# Patient Record
Sex: MALE | URBAN - METROPOLITAN AREA
[De-identification: names, ages, dates, MRNs, and addresses within clinical notes are randomized per-mention and may not be internally consistent; named-entity substitution may affect disease eponyms.]

---

## 2021-08-20 ENCOUNTER — LAB REQUISITION (OUTPATIENT)
Dept: LAB | Facility: CLINIC | Age: 71
End: 2021-08-20
Payer: COMMERCIAL

## 2021-08-20 DIAGNOSIS — Z00.00 ENCOUNTER FOR GENERAL ADULT MEDICAL EXAMINATION WITHOUT ABNORMAL FINDINGS: ICD-10-CM

## 2021-08-20 LAB
ALBUMIN SERPL-MCNC: 3.6 G/DL (ref 3.4–5)
ALP SERPL-CCNC: 83 U/L (ref 40–150)
ALT SERPL W P-5'-P-CCNC: 33 U/L (ref 0–70)
ANION GAP SERPL CALCULATED.3IONS-SCNC: 8 MMOL/L (ref 3–14)
AST SERPL W P-5'-P-CCNC: 26 U/L (ref 0–45)
BILIRUB SERPL-MCNC: 0.3 MG/DL (ref 0.2–1.3)
BUN SERPL-MCNC: 15 MG/DL (ref 7–30)
CALCIUM SERPL-MCNC: 8.7 MG/DL (ref 8.5–10.1)
CHLORIDE BLD-SCNC: 111 MMOL/L (ref 94–109)
CHOLEST SERPL-MCNC: 193 MG/DL
CO2 SERPL-SCNC: 21 MMOL/L (ref 20–32)
CREAT SERPL-MCNC: 0.93 MG/DL (ref 0.66–1.25)
FASTING STATUS PATIENT QL REPORTED: NO
GFR SERPL CREATININE-BSD FRML MDRD: 82 ML/MIN/1.73M2
GLUCOSE BLD-MCNC: 115 MG/DL (ref 70–99)
HDLC SERPL-MCNC: 37 MG/DL
LDLC SERPL CALC-MCNC: 132 MG/DL
NONHDLC SERPL-MCNC: 156 MG/DL
POTASSIUM BLD-SCNC: 3.8 MMOL/L (ref 3.4–5.3)
PROT SERPL-MCNC: 8.2 G/DL (ref 6.8–8.8)
PSA SERPL-MCNC: 4.18 UG/L (ref 0–4)
SODIUM SERPL-SCNC: 140 MMOL/L (ref 133–144)
TRIGL SERPL-MCNC: 119 MG/DL
TSH SERPL DL<=0.005 MIU/L-ACNC: 1.88 MU/L (ref 0.4–4)

## 2021-08-20 PROCEDURE — 86803 HEPATITIS C AB TEST: CPT | Mod: ORL | Performed by: FAMILY MEDICINE

## 2021-08-20 PROCEDURE — 80053 COMPREHEN METABOLIC PANEL: CPT | Mod: ORL | Performed by: FAMILY MEDICINE

## 2021-08-20 PROCEDURE — G0103 PSA SCREENING: HCPCS | Mod: ORL | Performed by: FAMILY MEDICINE

## 2021-08-20 PROCEDURE — 87340 HEPATITIS B SURFACE AG IA: CPT | Mod: ORL | Performed by: FAMILY MEDICINE

## 2021-08-20 PROCEDURE — 86780 TREPONEMA PALLIDUM: CPT | Mod: ORL | Performed by: FAMILY MEDICINE

## 2021-08-20 PROCEDURE — 84443 ASSAY THYROID STIM HORMONE: CPT | Mod: ORL | Performed by: FAMILY MEDICINE

## 2021-08-20 PROCEDURE — 80061 LIPID PANEL: CPT | Mod: ORL | Performed by: FAMILY MEDICINE

## 2021-08-20 PROCEDURE — 86706 HEP B SURFACE ANTIBODY: CPT | Mod: ORL | Performed by: FAMILY MEDICINE

## 2021-08-21 LAB — T PALLIDUM AB SER QL: NONREACTIVE

## 2021-08-23 LAB
HBV SURFACE AB SERPL IA-ACNC: 117.62 M[IU]/ML
HBV SURFACE AG SERPL QL IA: NONREACTIVE
HCV AB SERPL QL IA: NONREACTIVE

## 2022-05-26 ENCOUNTER — LAB REQUISITION (OUTPATIENT)
Dept: LAB | Facility: CLINIC | Age: 72
End: 2022-05-26
Payer: COMMERCIAL

## 2022-05-26 DIAGNOSIS — Z00.00 ENCOUNTER FOR GENERAL ADULT MEDICAL EXAMINATION WITHOUT ABNORMAL FINDINGS: ICD-10-CM

## 2022-05-26 LAB
ALBUMIN SERPL-MCNC: 3.6 G/DL (ref 3.4–5)
ALP SERPL-CCNC: 82 U/L (ref 40–150)
ALT SERPL W P-5'-P-CCNC: 32 U/L (ref 0–70)
ANION GAP SERPL CALCULATED.3IONS-SCNC: 7 MMOL/L (ref 3–14)
AST SERPL W P-5'-P-CCNC: 23 U/L (ref 0–45)
BILIRUB SERPL-MCNC: 0.4 MG/DL (ref 0.2–1.3)
BUN SERPL-MCNC: 14 MG/DL (ref 7–30)
CALCIUM SERPL-MCNC: 9.3 MG/DL (ref 8.5–10.1)
CHLORIDE BLD-SCNC: 103 MMOL/L (ref 94–109)
CHOLEST SERPL-MCNC: 191 MG/DL
CO2 SERPL-SCNC: 28 MMOL/L (ref 20–32)
CREAT SERPL-MCNC: 0.88 MG/DL (ref 0.66–1.25)
FASTING STATUS PATIENT QL REPORTED: NO
GFR SERPL CREATININE-BSD FRML MDRD: >90 ML/MIN/1.73M2
GLUCOSE BLD-MCNC: 101 MG/DL (ref 70–99)
HDLC SERPL-MCNC: 36 MG/DL
LDLC SERPL CALC-MCNC: 131 MG/DL
NONHDLC SERPL-MCNC: 155 MG/DL
POTASSIUM BLD-SCNC: 4 MMOL/L (ref 3.4–5.3)
PROT SERPL-MCNC: 8.3 G/DL (ref 6.8–8.8)
PSA SERPL-MCNC: 2.88 UG/L (ref 0–4)
SODIUM SERPL-SCNC: 138 MMOL/L (ref 133–144)
TRIGL SERPL-MCNC: 121 MG/DL

## 2022-05-26 PROCEDURE — 80053 COMPREHEN METABOLIC PANEL: CPT | Mod: ORL | Performed by: FAMILY MEDICINE

## 2022-05-26 PROCEDURE — 80061 LIPID PANEL: CPT | Mod: ORL | Performed by: FAMILY MEDICINE

## 2022-05-26 PROCEDURE — G0103 PSA SCREENING: HCPCS | Performed by: FAMILY MEDICINE

## 2022-08-24 ENCOUNTER — LAB REQUISITION (OUTPATIENT)
Dept: LAB | Facility: CLINIC | Age: 72
End: 2022-08-24
Payer: COMMERCIAL

## 2022-08-24 DIAGNOSIS — R50.9 FEVER, UNSPECIFIED: ICD-10-CM

## 2022-08-24 LAB
ALBUMIN SERPL BCG-MCNC: 3.8 G/DL (ref 3.5–5.2)
ALP SERPL-CCNC: 73 U/L (ref 40–129)
ALT SERPL W P-5'-P-CCNC: 17 U/L (ref 10–50)
ANION GAP SERPL CALCULATED.3IONS-SCNC: 10 MMOL/L (ref 7–15)
AST SERPL W P-5'-P-CCNC: 21 U/L (ref 10–50)
BILIRUB SERPL-MCNC: 0.4 MG/DL
BUN SERPL-MCNC: 10.8 MG/DL (ref 8–23)
CALCIUM SERPL-MCNC: 9.7 MG/DL (ref 8.8–10.2)
CHLORIDE SERPL-SCNC: 103 MMOL/L (ref 98–107)
CREAT SERPL-MCNC: 0.81 MG/DL (ref 0.67–1.17)
DEPRECATED HCO3 PLAS-SCNC: 25 MMOL/L (ref 22–29)
GFR SERPL CREATININE-BSD FRML MDRD: >90 ML/MIN/1.73M2
GLUCOSE SERPL-MCNC: 82 MG/DL (ref 70–99)
POTASSIUM SERPL-SCNC: 4.2 MMOL/L (ref 3.4–5.3)
PROT SERPL-MCNC: 7.8 G/DL (ref 6.4–8.3)
SODIUM SERPL-SCNC: 138 MMOL/L (ref 136–145)

## 2022-08-24 PROCEDURE — 80053 COMPREHEN METABOLIC PANEL: CPT | Mod: ORL | Performed by: FAMILY MEDICINE

## 2022-08-24 PROCEDURE — 87015 SPECIMEN INFECT AGNT CONCNTJ: CPT | Mod: ORL | Performed by: FAMILY MEDICINE

## 2022-08-30 ENCOUNTER — LAB REQUISITION (OUTPATIENT)
Dept: LAB | Facility: CLINIC | Age: 72
End: 2022-08-30
Payer: COMMERCIAL

## 2022-08-30 DIAGNOSIS — R50.9 FEVER, UNSPECIFIED: ICD-10-CM

## 2022-08-31 LAB — MALARIA SMEAR BLD: NEGATIVE

## 2022-11-28 ENCOUNTER — LAB REQUISITION (OUTPATIENT)
Dept: LAB | Facility: CLINIC | Age: 72
End: 2022-11-28
Payer: COMMERCIAL

## 2022-11-28 DIAGNOSIS — R50.9 FEVER, UNSPECIFIED: ICD-10-CM

## 2022-12-02 LAB
SARS-COV-2 RNA RESP QL NAA+PROBE: NEGATIVE
SCANNED LAB RESULT: NORMAL

## 2023-07-14 ENCOUNTER — LAB REQUISITION (OUTPATIENT)
Dept: LAB | Facility: CLINIC | Age: 73
End: 2023-07-14
Payer: COMMERCIAL

## 2023-07-14 DIAGNOSIS — Z00.00 ENCOUNTER FOR GENERAL ADULT MEDICAL EXAMINATION WITHOUT ABNORMAL FINDINGS: ICD-10-CM

## 2023-07-14 LAB
ALBUMIN SERPL BCG-MCNC: 4.2 G/DL (ref 3.5–5.2)
ALP SERPL-CCNC: 70 U/L (ref 40–129)
ALT SERPL W P-5'-P-CCNC: 17 U/L (ref 0–70)
ANION GAP SERPL CALCULATED.3IONS-SCNC: 13 MMOL/L (ref 7–15)
AST SERPL W P-5'-P-CCNC: 23 U/L (ref 0–45)
BILIRUB SERPL-MCNC: 0.2 MG/DL
BUN SERPL-MCNC: 18 MG/DL (ref 8–23)
CALCIUM SERPL-MCNC: 9.2 MG/DL (ref 8.8–10.2)
CHLORIDE SERPL-SCNC: 105 MMOL/L (ref 98–107)
CREAT SERPL-MCNC: 1.05 MG/DL (ref 0.67–1.17)
DEPRECATED HCO3 PLAS-SCNC: 22 MMOL/L (ref 22–29)
GFR SERPL CREATININE-BSD FRML MDRD: 75 ML/MIN/1.73M2
GLUCOSE SERPL-MCNC: 100 MG/DL (ref 70–99)
POTASSIUM SERPL-SCNC: 4.2 MMOL/L (ref 3.4–5.3)
PROT SERPL-MCNC: 7.6 G/DL (ref 6.4–8.3)
SODIUM SERPL-SCNC: 140 MMOL/L (ref 136–145)
T4 FREE SERPL-MCNC: 0.93 NG/DL (ref 0.9–1.7)
TSH SERPL DL<=0.005 MIU/L-ACNC: 0.02 UIU/ML (ref 0.3–4.2)

## 2023-07-14 PROCEDURE — 84443 ASSAY THYROID STIM HORMONE: CPT | Mod: ORL | Performed by: FAMILY MEDICINE

## 2023-07-14 PROCEDURE — 84439 ASSAY OF FREE THYROXINE: CPT | Mod: ORL | Performed by: FAMILY MEDICINE

## 2023-07-14 PROCEDURE — 80053 COMPREHEN METABOLIC PANEL: CPT | Mod: ORL | Performed by: FAMILY MEDICINE

## 2023-12-19 ENCOUNTER — LAB REQUISITION (OUTPATIENT)
Dept: LAB | Facility: CLINIC | Age: 73
End: 2023-12-19
Payer: COMMERCIAL

## 2023-12-19 DIAGNOSIS — Z00.00 ENCOUNTER FOR GENERAL ADULT MEDICAL EXAMINATION WITHOUT ABNORMAL FINDINGS: ICD-10-CM

## 2023-12-19 LAB — HEMOCCULT STL QL IA: NEGATIVE

## 2023-12-19 PROCEDURE — 82274 ASSAY TEST FOR BLOOD FECAL: CPT | Mod: ORL | Performed by: FAMILY MEDICINE

## 2024-05-13 ENCOUNTER — LAB REQUISITION (OUTPATIENT)
Dept: LAB | Facility: CLINIC | Age: 74
End: 2024-05-13
Payer: COMMERCIAL

## 2024-05-13 DIAGNOSIS — R04.0 EPISTAXIS: ICD-10-CM

## 2024-05-13 PROCEDURE — 86481 TB AG RESPONSE T-CELL SUSP: CPT | Mod: ORL | Performed by: FAMILY MEDICINE

## 2024-05-15 LAB
GAMMA INTERFERON BACKGROUND BLD IA-ACNC: 0.11 IU/ML
M TB IFN-G BLD-IMP: NEGATIVE
M TB IFN-G CD4+ BCKGRND COR BLD-ACNC: 9.89 IU/ML
MITOGEN IGNF BCKGRD COR BLD-ACNC: -0.01 IU/ML
MITOGEN IGNF BCKGRD COR BLD-ACNC: 0.03 IU/ML
QUANTIFERON MITOGEN: 10 IU/ML
QUANTIFERON NIL TUBE: 0.11 IU/ML
QUANTIFERON TB1 TUBE: 0.14 IU/ML
QUANTIFERON TB2 TUBE: 0.1

## 2025-04-21 ENCOUNTER — MEDICAL CORRESPONDENCE (OUTPATIENT)
Dept: HEALTH INFORMATION MANAGEMENT | Facility: CLINIC | Age: 75
End: 2025-04-21
Payer: COMMERCIAL

## 2025-04-22 ENCOUNTER — TRANSCRIBE ORDERS (OUTPATIENT)
Dept: OTHER | Age: 75
End: 2025-04-22

## 2025-04-22 DIAGNOSIS — R22.31 NODULE OF FINGER OF RIGHT HAND: Primary | ICD-10-CM

## 2025-05-06 ENCOUNTER — APPOINTMENT (OUTPATIENT)
Dept: INTERPRETER SERVICES | Facility: CLINIC | Age: 75
End: 2025-05-06
Payer: COMMERCIAL

## 2025-05-06 NOTE — TELEPHONE ENCOUNTER
DIAGNOSIS:   Nodule of finger of right hand [R22.31]  - Primary   APPOINTMENT DATE: 05/20/2025   NOTES STATUS DETAILS   OFFICE NOTE from referring provider Internal 04/21/2025 - Henry Ferrer MD  - [facility]

## 2025-05-12 NOTE — H&P (VIEW-ONLY)
"Chief Complaint:   Chief Complaint   Patient presents with    RECHECK     Right hand nodule        Referring Physician: Henry Ferrer    Diagnosis: Right long finger mass   Treatment: None    History of Present Illness: Fish Dillard is a 75 year old RHD male presenting for evaluation of right long finger dorsal mass that has been slowly growing for the last year. Denies pain, deficits in range of motion, numbness or tingling. He would like to have it removed.     Clinical documentation by Dr. Ferrer on 4/21/2025 was reviewed.    Occupation: None    Past Medical History: Denies history of diabetes, heart disease.     Past Surgical History: No past surgical history on file.    Medications:  Tylenol, flonase, vit d    Allergy: None    Social History:   History   Smoking Status    Not on file   Smokeless Tobacco    Not on file          Family History: No family history on file.    Physical Examination:  Vitals:    05/20/25 1506   Weight: 78 kg (172 lb)   Height: 1.753 m (5' 9\")     Body mass index is 25.4 kg/m .    Well appearing, well nourished  Alert and oriented x 3, cooperative with exam     Right hand  There is a palpable raised mass along the dorsal proximal middle phalanx. It is nontender. Mobile underneath subcutanous tissue. Does not appear adherent to tendon or bone.   Motor Exam: Full extension and flexion of the long finger.   Sensory Exam: Sensation intact to light touch in FDWS (radial), volar IF (median), volar SF (ulnar)  Vascular Exam: fingers warm, well perfused    Imaging/Studies:  XR (3 views) of the right middle finger was obtained 5/20/2025.  I reviewed the images with the patient.  The imaging study shows no fracture/dislocation.  Well maintained joint space. No obvious masses or irregularities.     Assessment: Fish Dillard is a 75 year old male with likely cystic mass along the dorsum of right long finger. Discussed options including monitoring, aspiration, vs surgical resection. Risks " of surgical resection include bleeding, infection, wound issues, damage to surrounding structures, and recurrence of mass. The mass will be sent for pathology. Patient prefers surgical resection.     Plan:   I had a discussion with the patient regarding my clinical findings, diagnosis, and treatment plan.  All questions answered.     Surgery orders placed for right long finger mass excision to be performed in procedure room under local only     Next Visit:   Follow-up: 7-10 days after procedure    Imaging: None   Plan: Mass excision and then follow up in 7-10 days for wound check     Discussed and seen with Dr. Vaishali Bird MD  Hand Fellow     I have seen and evaluated this patient myself and agree with the documentation.  I had a detailed discussion with the patient regarding my clinical findings, diagnosis, and treatment plan. I reviewed the treatment options for his mass (observation, excision, etc.) as well as the risks and benefits associated with each.  Due to persistent pain/discomfort with ADLs, the patient would like the mass removed.  I had a thorough discussion today with the patient with regard to the intended surgical procedure. We reviewed the risks of surgery, which include but are not limited to infection, bleeding, nerve injury, permanent numbness, recurrence, post-operative stiffness, surgical scar, CRPS, anesthetic complications, etc.  We also discussed that there is a possibility that the surgery will not be successful and will require repeat surgery. We reviewed post-operative pain management and rehabilitation. The patient understands and agrees to the treatment plan.  All questions answered.      Plan for operative intervention in the form of right middle finger mass excision     Next Visit:   Follow-up: 10-14 days after surgery.   Imaging: None.   Plan: Wound check.    JES IRBY MD

## 2025-05-12 NOTE — PROGRESS NOTES
"Chief Complaint:   Chief Complaint   Patient presents with    RECHECK     Right hand nodule        Referring Physician: Henry Ferrer    Diagnosis: Right long finger mass   Treatment: None    History of Present Illness: Fish Dillard is a 75 year old RHD male presenting for evaluation of right long finger dorsal mass that has been slowly growing for the last year. Denies pain, deficits in range of motion, numbness or tingling. He would like to have it removed.     Clinical documentation by Dr. Ferrer on 4/21/2025 was reviewed.    Occupation: None    Past Medical History: Denies history of diabetes, heart disease.     Past Surgical History: No past surgical history on file.    Medications:  Tylenol, flonase, vit d    Allergy: None    Social History:   History   Smoking Status    Not on file   Smokeless Tobacco    Not on file          Family History: No family history on file.    Physical Examination:  Vitals:    05/20/25 1506   Weight: 78 kg (172 lb)   Height: 1.753 m (5' 9\")     Body mass index is 25.4 kg/m .    Well appearing, well nourished  Alert and oriented x 3, cooperative with exam     Right hand  There is a palpable raised mass along the dorsal proximal middle phalanx. It is nontender. Mobile underneath subcutanous tissue. Does not appear adherent to tendon or bone.   Motor Exam: Full extension and flexion of the long finger.   Sensory Exam: Sensation intact to light touch in FDWS (radial), volar IF (median), volar SF (ulnar)  Vascular Exam: fingers warm, well perfused    Imaging/Studies:  XR (3 views) of the right middle finger was obtained 5/20/2025.  I reviewed the images with the patient.  The imaging study shows no fracture/dislocation.  Well maintained joint space. No obvious masses or irregularities.     Assessment: Fish Dillard is a 75 year old male with likely cystic mass along the dorsum of right long finger. Discussed options including monitoring, aspiration, vs surgical resection. Risks " of surgical resection include bleeding, infection, wound issues, damage to surrounding structures, and recurrence of mass. The mass will be sent for pathology. Patient prefers surgical resection.     Plan:   I had a discussion with the patient regarding my clinical findings, diagnosis, and treatment plan.  All questions answered.     Surgery orders placed for right long finger mass excision to be performed in procedure room under local only     Next Visit:   Follow-up: 7-10 days after procedure    Imaging: None   Plan: Mass excision and then follow up in 7-10 days for wound check     Discussed and seen with Dr. Vaishali Bird MD  Hand Fellow     I have seen and evaluated this patient myself and agree with the documentation.  I had a detailed discussion with the patient regarding my clinical findings, diagnosis, and treatment plan. I reviewed the treatment options for his mass (observation, excision, etc.) as well as the risks and benefits associated with each.  Due to persistent pain/discomfort with ADLs, the patient would like the mass removed.  I had a thorough discussion today with the patient with regard to the intended surgical procedure. We reviewed the risks of surgery, which include but are not limited to infection, bleeding, nerve injury, permanent numbness, recurrence, post-operative stiffness, surgical scar, CRPS, anesthetic complications, etc.  We also discussed that there is a possibility that the surgery will not be successful and will require repeat surgery. We reviewed post-operative pain management and rehabilitation. The patient understands and agrees to the treatment plan.  All questions answered.      Plan for operative intervention in the form of right middle finger mass excision     Next Visit:   Follow-up: 10-14 days after surgery.   Imaging: None.   Plan: Wound check.    JES IRYB MD

## 2025-05-13 DIAGNOSIS — R22.30: Primary | ICD-10-CM

## 2025-05-20 ENCOUNTER — OFFICE VISIT (OUTPATIENT)
Dept: ORTHOPEDICS | Facility: CLINIC | Age: 75
End: 2025-05-20
Attending: FAMILY MEDICINE
Payer: COMMERCIAL

## 2025-05-20 ENCOUNTER — PRE VISIT (OUTPATIENT)
Dept: ORTHOPEDICS | Facility: CLINIC | Age: 75
End: 2025-05-20

## 2025-05-20 ENCOUNTER — ANCILLARY PROCEDURE (OUTPATIENT)
Dept: GENERAL RADIOLOGY | Facility: CLINIC | Age: 75
End: 2025-05-20
Attending: STUDENT IN AN ORGANIZED HEALTH CARE EDUCATION/TRAINING PROGRAM
Payer: COMMERCIAL

## 2025-05-20 VITALS — WEIGHT: 172 LBS | HEIGHT: 69 IN | BODY MASS INDEX: 25.48 KG/M2

## 2025-05-20 DIAGNOSIS — R22.31 NODULE OF FINGER OF RIGHT HAND: ICD-10-CM

## 2025-05-20 DIAGNOSIS — R22.30: ICD-10-CM

## 2025-05-20 PROCEDURE — 73130 X-RAY EXAM OF HAND: CPT | Mod: RT | Performed by: RADIOLOGY

## 2025-05-20 RX ORDER — CHOLECALCIFEROL (VITAMIN D3) 50 MCG
1 TABLET ORAL
COMMUNITY
Start: 2025-05-13

## 2025-05-20 RX ORDER — FLUTICASONE PROPIONATE 50 MCG
SPRAY, SUSPENSION (ML) NASAL
COMMUNITY
Start: 2025-05-16

## 2025-05-20 RX ORDER — PSEUDOEPHED/ACETAMINOPH/DIPHEN 30MG-500MG
TABLET ORAL
COMMUNITY
Start: 2025-05-05

## 2025-05-20 RX ORDER — MINERAL OIL/HYDROPHIL PETROLAT
OINTMENT (GRAM) TOPICAL
COMMUNITY
Start: 2025-05-16

## 2025-05-20 RX ORDER — POLYMYXIN B SULFATE AND TRIMETHOPRIM 1; 10000 MG/ML; [USP'U]/ML
SOLUTION OPHTHALMIC
COMMUNITY
Start: 2025-05-05 | End: 2025-05-20

## 2025-05-20 NOTE — NURSING NOTE
"Reason For Visit:   Chief Complaint   Patient presents with    RECHECK     Right hand nodule        Primary MD: Adelia Ref-Primary, Physician  Ref. MD: Nabil    Age: 75 year old    ?  No      Ht 1.753 m (5' 9\")   Wt 78 kg (172 lb)   BMI 25.40 kg/m        Pain Assessment  Patient Currently in Pain: No    Hand Dominance Evaluation  Hand Dominance: Right         Current Outpatient Medications   Medication Sig Dispense Refill    acetaminophen (TYLENOL) 500 MG tablet TAKE 2 TABLETS BY MOUTH EVERY SIX (6) HOURS AS NEEDED FOR PAIN OR FEVER      fluticasone (FLONASE) 50 MCG/ACT nasal spray SPRAY TWO (2) SPRAYS INTO BOTH NOSTRILS ONCE DAILY      mineral oil-hydrophilic petrolatum (AQUAPHOR) external ointment APPLY ONE (1) APPLICATION  TOPICALLY TWO (2) (TWO) TIMES DAILY AS NEEDED (FOR DRY SKIN)      VITAMIN D3 50 MCG (2000 UT) tablet Take 1 tablet by mouth daily at 2 pm.         No Known Allergies    Fidelia Naik"

## 2025-05-20 NOTE — LETTER
"5/20/2025      Fish Dillard  [address]  [place] [address]      Dear Colleague,    Thank you for referring your patient, Fish Dillard, to the [facility] [place]. Please see a copy of my visit note below.    Chief Complaint:   Chief Complaint   Patient presents with     RECHECK     Right hand nodule        Referring Physician: Henry Ferrer    Diagnosis: Right long finger mass   Treatment: None    History of Present Illness: Fish Dillard is a 75 year old RHD male presenting for evaluation of right long finger dorsal mass that has been slowly growing for the last year. Denies pain, deficits in range of motion, numbness or tingling. He would like to have it removed.     Clinical documentation by Dr. Ferrer on 4/21/2025 was reviewed.    Occupation: None    Past Medical History: Denies history of diabetes, heart disease.     Past Surgical History: No past surgical history on file.    Medications:  Tylenol, flonase, vit d    Allergy: None    Social History:   History   Smoking Status     Not on file   Smokeless Tobacco     Not on file          Family History: No family history on file.    Physical Examination:  Vitals:    05/20/25 1506   Weight: 78 kg (172 lb)   Height: 1.753 m (5' 9\")     Body mass index is 25.4 kg/m .    Well appearing, well nourished  Alert and oriented x 3, cooperative with exam     Right hand  There is a palpable raised mass along the dorsal proximal middle phalanx. It is nontender. Mobile underneath subcutanous tissue. Does not appear adherent to tendon or bone.   Motor Exam: Full extension and flexion of the long finger.   Sensory Exam: Sensation intact to light touch in FDWS (radial), volar IF (median), volar SF (ulnar)  Vascular Exam: fingers warm, well perfused    Imaging/Studies:  XR (3 views) of the right middle finger was obtained 5/20/2025.  I reviewed the images with the patient.  The imaging study shows no fracture/dislocation.  " Well maintained joint space. No obvious masses or irregularities.     Assessment: Fish Dillard is a 75 year old male with likely cystic mass along the dorsum of right long finger. Discussed options including monitoring, aspiration, vs surgical resection. Risks of surgical resection include bleeding, infection, wound issues, damage to surrounding structures, and recurrence of mass. The mass will be sent for pathology. Patient prefers surgical resection.     Plan:   I had a discussion with the patient regarding my clinical findings, diagnosis, and treatment plan.  All questions answered.      Surgery orders placed for right long finger mass excision to be performed in procedure room under local only     Next Visit:    Follow-up: 7-10 days after procedure     Imaging: None    Plan: Mass excision and then follow up in 7-10 days for wound check     Discussed and seen with Dr. Vaishali Bird MD  Hand Fellow     I have seen and evaluated this patient myself and agree with the documentation.  I had a detailed discussion with the patient regarding my clinical findings, diagnosis, and treatment plan. I reviewed the treatment options for his mass (observation, excision, etc.) as well as the risks and benefits associated with each.  Due to persistent pain/discomfort with ADLs, the patient would like the mass removed.  I had a thorough discussion today with the patient with regard to the intended surgical procedure. We reviewed the risks of surgery, which include but are not limited to infection, bleeding, nerve injury, permanent numbness, recurrence, post-operative stiffness, surgical scar, CRPS, anesthetic complications, etc.  We also discussed that there is a possibility that the surgery will not be successful and will require repeat surgery. We reviewed post-operative pain management and rehabilitation. The patient understands and agrees to the treatment plan.  All questions answered.       Plan for operative  intervention in the form of right middle finger mass excision     Next Visit:    Follow-up: 10-14 days after surgery.    Imaging: None.    Plan: Wound check.    JES IRBY MD      Again, thank you for allowing me to participate in the care of your patient.        Sincerely,        JES IRBY MD    Electronically signed

## 2025-05-20 NOTE — PROGRESS NOTES
Teaching Flowsheet     Visit Type: In Clinic    Time Start: .  Time End: .  Total Time Spent: 15 min.    Surgeon: Vaishali  Location of Surgery (known or anticipated): .  Type of Anesthesia: Local  Worker's Compensation Procedure: No    Pertinent Medical History: .  Were medical conditions reviewed and appropriate for location? Yes  BMI: Overweight BMI 25-29.9    Relevant Diagnosis: finger  Teaching Topic: preop    Person(s) involved in teaching:   Patient and Son who interprets   : Yes. ID#: son   Verified Patient's Phone Number: YES: .    Caregiver//  Name: son  Phone Number: .   Relationship: Son  Consent to Communicate on file: Yes  Authorization to Share Protected Health Information- Person to person communication signed by patient and authorized the following person or people:      Motivation Level:  Asks Questions: Yes  Eager to Learn: Yes  Cooperative: Yes  Receptive (willing/able to accept information): Yes  Any cultural factors/Restoration beliefs that may influence understanding or compliance? No     Patient & SON demonstrates understanding of the following:  Reason for the appointment, diagnosis and treatment plan: Yes  Knowledge of proper use of medications and conditions for which they are ordered (with special attention to potential side effects or drug interactions): Yes  Which situations necessitate calling provider and whom to contact: Yes     Teaching Concerns Addressed:   Proper use and care of medical equip, care aids, etc.: Yes  Nutritional needs and diet plan: Yes  Pain management techniques: Yes  Wound Care: Yes  How and/when to access community resources: Yes  Need for pre-op with in 30 days: NO     Does patient have difficulty getting a ride to appointments (post-ops, PT/OT): No  Patient's plan after discharge: home with family or spouse     Instructional Materials Used/Given:  two bottles of chlorhexidine soap and a surgery packet given to patient in clinic. Instructed  patient to buy or get two 8 ounces bottles of antiseptic surgical soap called 4% CHG. Common name brand of this soap are Hibiclens and Exidine. I told them they can find this at their local pharmacy, clinic or retail store. If they have trouble finding it, I told them to ask their pharmacist to help them find a substitute.   - Important Contact Info/Phone Numbers: emphasizing clinic number [phone] and after hours number [phone]  - Map/location of surgery and follow-up appointments  - Showering instructions  - Stoplight Tool     -Next step: schedule a surgery date.    Caitlin Barnes RN

## 2025-05-21 ENCOUNTER — TELEPHONE (OUTPATIENT)
Dept: ORTHOPEDICS | Facility: CLINIC | Age: 75
End: 2025-05-21
Payer: COMMERCIAL

## 2025-05-21 PROBLEM — R22.31 NODULE OF FINGER OF RIGHT HAND: Status: ACTIVE | Noted: 2025-05-20

## 2025-05-21 NOTE — TELEPHONE ENCOUNTER
Outgoing call made to patient to schedule surgery with Dr. Tom.    Spoke with: Patient via Phone      Reason for Surgical Date: Next available    Date of Surgery: 5/27/2025    Estimated Arrival time Discussed with Patient:  Yes, in the morning before 12 PM.    Location of surgery: [facility]     Pre-Op H&P: N/A - Local Only    Imaging: No     Additional Appointments: No     Post-Op Appt Date w/ NP/PA:  Jennifer Rodríguez PA-C  6/6/2025 at 3:20 PM    Discussed with patient pre-op RN will call 2-3 days prior to surgery with arrival time and instructions:  Yes     Standard Surgery Packet Sent: Yes 05/20/202525  via Received in clinic by RN.       Additional Comments: Per patient request, sent patient text message via EP[employer] to set up "[employer]" to view future appointments.    Biju Courtney on 5/21/2025 at 10:01 AM

## 2025-05-22 ENCOUNTER — LAB REQUISITION (OUTPATIENT)
Dept: LAB | Facility: CLINIC | Age: 75
End: 2025-05-22
Payer: COMMERCIAL

## 2025-05-22 DIAGNOSIS — Z12.11 ENCOUNTER FOR SCREENING FOR MALIGNANT NEOPLASM OF COLON: ICD-10-CM

## 2025-05-22 PROCEDURE — 82274 ASSAY TEST FOR BLOOD FECAL: CPT | Mod: ORL | Performed by: FAMILY MEDICINE

## 2025-05-23 LAB — HEMOCCULT STL QL IA: NEGATIVE

## 2025-05-27 ENCOUNTER — HOSPITAL ENCOUNTER (OUTPATIENT)
Facility: AMBULATORY SURGERY CENTER | Age: 75
Discharge: HOME | End: 2025-05-27
Attending: STUDENT IN AN ORGANIZED HEALTH CARE EDUCATION/TRAINING PROGRAM
Payer: COMMERCIAL

## 2025-05-27 VITALS
WEIGHT: 172 LBS | RESPIRATION RATE: 14 BRPM | TEMPERATURE: 96.9 F | OXYGEN SATURATION: 99 % | SYSTOLIC BLOOD PRESSURE: 146 MMHG | DIASTOLIC BLOOD PRESSURE: 94 MMHG | BODY MASS INDEX: 25.48 KG/M2 | HEIGHT: 69 IN | HEART RATE: 71 BPM

## 2025-05-27 DIAGNOSIS — R22.31 NODULE OF FINGER OF RIGHT HAND: Primary | ICD-10-CM

## 2025-05-27 PROCEDURE — 88312 SPECIAL STAINS GROUP 1: CPT | Mod: 26 | Performed by: STUDENT IN AN ORGANIZED HEALTH CARE EDUCATION/TRAINING PROGRAM

## 2025-05-27 PROCEDURE — 88305 TISSUE EXAM BY PATHOLOGIST: CPT | Mod: 26 | Performed by: STUDENT IN AN ORGANIZED HEALTH CARE EDUCATION/TRAINING PROGRAM

## 2025-05-27 PROCEDURE — 88312 SPECIAL STAINS GROUP 1: CPT | Mod: TC | Performed by: STUDENT IN AN ORGANIZED HEALTH CARE EDUCATION/TRAINING PROGRAM

## 2025-05-27 RX ORDER — OXYCODONE HYDROCHLORIDE 5 MG/1
5 TABLET ORAL EVERY 6 HOURS PRN
Qty: 5 TABLET | Refills: 0 | Status: SHIPPED | OUTPATIENT
Start: 2025-05-27 | End: 2025-05-30

## 2025-05-27 RX ORDER — LIDOCAINE HYDROCHLORIDE 10 MG/ML
5 INJECTION, SOLUTION EPIDURAL; INFILTRATION; INTRACAUDAL; PERINEURAL ONCE
Status: DISCONTINUED | OUTPATIENT
Start: 2025-05-27 | End: 2025-05-28 | Stop reason: HOSPADM

## 2025-05-27 RX ORDER — BUPIVACAINE HYDROCHLORIDE 5 MG/ML
5 INJECTION, SOLUTION EPIDURAL; INTRACAUDAL; PERINEURAL ONCE
Status: DISCONTINUED | OUTPATIENT
Start: 2025-05-27 | End: 2025-05-28 | Stop reason: HOSPADM

## 2025-05-27 RX ORDER — MAGNESIUM HYDROXIDE 1200 MG/15ML
LIQUID ORAL PRN
Status: DISCONTINUED | OUTPATIENT
Start: 2025-05-27 | End: 2025-05-27 | Stop reason: HOSPADM

## 2025-05-27 NOTE — OP NOTE
OPERATIVE REPORT    PATIENT NAME: Fish Dillard  MRN: [id number]    DATE: 5/27/2025    PREOPERATIVE DIAGNOSIS:   1. Right middle finger mass    POSTOPERATIVE DIAGNOSIS:   1. Right middle finger mass     OPERATION:   1. Right middle finger mass excision, subcutaneous digit mass.  SURGEON: Carmen Tom MD    RESIDENT/FELLOW: Donna Bird MD, fellow    STAFF: Circulator: Snehal Pierce RN  Scrub Person: Monserrat Purvis  Local Monitor: Noy Aaron    ANESTHESIA: Local    IMPLANTS: * No implants in log *    ESTIMATED BLOOD LOSS: 5 mL    FLUIDS: See anesthesia records.     URINE OUTPUT: Not applicable.  Whitten was not placed.     TOURNIQUET TIME: 4 minutes.    COMPLICATION: None.     INDICATIONS: Fish Dillard is a 75 year old male who developed a right middle finger mass which has been symptomatic.  After a thorough evaluation and discussion of treatment options, the patient was indicated for operative intervention.  The risks, benefits, and alternatives were discussed with the patient.  The patient verbalized understanding of the treatment plan and signed the consent.    DESCRIPTION OF PROCEDURE: The base of the finger was prepped with alcohol and a digital block using 1% lidocaine and 0.5% bupivacaine was used. The patient was taken to the operating room and placed in supine position on the operating table. The right hand was prepped and draped in the usual sterile fashion.  A timeout was performed with all OR staff.  The patient name, MRN, operative extremity, procedure, allergies, antibiotics, DVT prophylaxis, and fire precautions/plan were reviewed, and all were in agreement.     A finger based tourniquet was applied. A 1.5 cm incision was made about the dorsal aspect of the middle finger PIP joint.  Skin and subcutaneous tissue were sharply incised.  The mass was identified and  from adjacent tissue along an areolar plane circumferentially.  The mass was completely resected and sent to  pathology.  The wound was copiously irrigated. Tourniquet was let down.  Closure was performed with interrupted 4-0 Vicryl rapide suture.  Soft dressing was applied.   Capillary refill was intact, < 2 seconds.     The patient was aroused from anesthesia and taken to the recovery room in stable condition.      All counts were correct at the end of the case.       There were no complications.    POSTOPERATIVE PLAN: return to clinic in 1 week for wound check    JES IRBY MD

## 2025-05-27 NOTE — DISCHARGE INSTRUCTIONS
Date: 2025    DIAGNOSIS  1. Nodule of finger of right hand        MEDICATIONS   Resume all home medications as directed unless otherwise instructed during this hospitalization. If there is any question, double check with your primary care provider.  Start new discharge medications as directed.    Take 1 tablet of 650 mg Tylenol (acetaminophen) Arthritis Strength (extended release) and 1 tablet of Aleve (naproxen) 220 mg in the morning with breakfast and in the evening with dinner.     For breakthrough pain use narcotic pain medication as prescribed.    Do not drive or operate machinery while taking narcotic pain medications.   If you are taking other Tylenol containing medicines at home, be sure NOT to exceed 4 gram's (4000 milligrams) of Tylenol per day.   If you are taking pain medications, be sure to take Colace (docusate sodium) as well to prevent constipation. If constipated, try adding another cathartic or enema.  If nausea and vomiting, call the hospital or seek medical attention.    ACTIVITY   Weight bearin lb coffee cup weight bearing to operative extremity    DIET  Resume same diet prior to your hospital admission.    WOUND   Leave dressing on until you are seen in clinic for your follow up visit.   Watch for signs and symptoms of infection of your wounds including; pain, redness, swelling, drainage or fever.  If you notice any of these symptoms please call or seek medical attention.    Keep wound clean, dry, and intact.  Do not submerge wounds in water until they are healed. No baths, soaking, swimming, or prolonged water exposure for 4 weeks after surgery.    RETURN   Follow-up with Orthopedic Clinic as directed.     Future Appointments   Date Time Provider Department Center   2025  3:20 PM Jennifer Rodríguez PA-C [facility]       Call the [facility] at [phone] during business hours for any symptoms such as:    * Fevers with Temperature greater than 101.5  degrees.   * Pus drainage from wound site.   * Severe pain, not controlled by medication.   * Persistent nausea, vomiting and inablility to tolerate fluids.    If you are receiving care in [place], you may call the Orthopedic clinic at [phone].    FOR URGENT PROBLEMS ONLY, after hours or on weekends call the hospital  at [phone] and ask to speak with the orthopedic resident on call.

## 2025-05-29 LAB
PATH REPORT.COMMENTS IMP SPEC: NORMAL
PATH REPORT.FINAL DX SPEC: NORMAL
PATH REPORT.GROSS SPEC: NORMAL
PATH REPORT.MICROSCOPIC SPEC OTHER STN: NORMAL
PATH REPORT.RELEVANT HX SPEC: NORMAL
PHOTO IMAGE: NORMAL

## 2025-06-06 ENCOUNTER — OFFICE VISIT (OUTPATIENT)
Dept: ORTHOPEDICS | Facility: CLINIC | Age: 75
End: 2025-06-06
Payer: COMMERCIAL

## 2025-06-06 DIAGNOSIS — Z98.890 POST-OPERATIVE STATE: ICD-10-CM

## 2025-06-06 DIAGNOSIS — R22.31 NODULE OF FINGER OF RIGHT HAND: Primary | ICD-10-CM

## 2025-06-06 PROCEDURE — 99024 POSTOP FOLLOW-UP VISIT: CPT | Performed by: PHYSICIAN ASSISTANT

## 2025-06-06 NOTE — NURSING NOTE
Reason For Visit:   Chief Complaint   Patient presents with    Surgical Followup     Post op Right middle finger mass excision, subcutaneous digit mass  DOS: 5/27/25       Primary MD: No Ref-Primary, Physician  Ref. MD: Est    Age: 75 year old    ?  Yes, specify language: Danish      There were no vitals taken for this visit.      Pain Assessment  Patient Currently in Pain: No (patient denies any pain in post op finger)    Hand Dominance Evaluation  Hand Dominance: Right          QuickDASH Assessment       No data to display                   Current Outpatient Medications   Medication Sig Dispense Refill    acetaminophen (TYLENOL) 500 MG tablet TAKE 2 TABLETS BY MOUTH EVERY SIX (6) HOURS AS NEEDED FOR PAIN OR FEVER      fluticasone (FLONASE) 50 MCG/ACT nasal spray SPRAY TWO (2) SPRAYS INTO BOTH NOSTRILS ONCE DAILY      mineral oil-hydrophilic petrolatum (AQUAPHOR) external ointment APPLY ONE (1) APPLICATION  TOPICALLY TWO (2) (TWO) TIMES DAILY AS NEEDED (FOR DRY SKIN)      VITAMIN D3 50 MCG (2000 UT) tablet Take 1 tablet by mouth daily at 2 pm.         Allergies   Allergen Reactions    Aspirin GI Disturbance       NANCY HUNG, ATC

## 2025-06-06 NOTE — LETTER
6/6/2025      Octaviano Reed  [address]  [facility] [address]      Dear Colleague,    Thank you for referring your patient, Octaviano Reed, to the [facility] [place]. Please see a copy of my visit note below.    Date of Service: Jun 6, 2025    Chief Complaint: Post operative follow up.     Date of Surgery: 5/27/2025    Procedure Performed:   1. Right middle finger mass excision, subcutaneous digit mass.     Surgeon: Dr. Margot Dooley    Interval events: Octaviano Reed is a 75 year old male who presents today for a postoperative follow up.  He is doing well.  Pain well-controlled.  Denies any numbness or tingling.    Patient does not recall injury prior to developing the mass.  Specifically he does not recall any splinters or punctures from plants.  He also denies any previous infectious symptoms.    The past medical history was reviewed updated in the EMR. This includes medications, surgeries, social history, and review of systems.    Physical examination:  Well-developed, well-nourished and in no acute distress.  Alert and oriented to surroundings.  On examination of the  right middle finger, incision is well-healed with sutures in place. There is no erythema, drainage, or dehiscence. Swelling is Mild.  Station intact to radial and ulnar digital nerves.  Patient can make a full composite fist.  Fingertip is warm and well-perfused.      Assessment: 75 year old male s/p right middle finger mass excision, progressing appropriately.  Pudgy results demonstrating fibrotic nodule containing foreign body with associated fungal organisms, granulomatous inflammation and abscess.  No evidence of infection today.    Plan:  Pathology results reviewed.  We discussed that it is possible he could have had a puncture wound such as a splinter that resulted in retained foreign body.  He does not currently have any signs of infection.  We did discuss signs and symptoms of infection,  nonhealing wound that would prompt a return to the clinic.  Patient should continue to wash wound with soap and water daily and pat dry. No immersing the wound in water for prolonged periods such as tub baths or dishwashing without gloves until wound has healed. Can start gentle active ROM as tolerated after the wound is fully healed.  Will have the patient follow-up in 6 weeks for recheck to ensure adequate healing, follow-up sooner with any questions or concerns.    HANNAH HAMILTON PA-C  Orthopaedic Surgery     Again, thank you for allowing me to participate in the care of your patient.        Sincerely,        Hannah Hamilton PA-C    Electronically signed

## 2025-06-06 NOTE — Clinical Note
Did you see his pathology results?  Patient did not remember any pokes or splinters.  He didn't have any signs of infection.  I talked with him about continuing to observe, I scheduled follow-up in about 6 weeks just to make sure he is doing well -told him that if all is well he can cancel.  Wound looked rate.  Any other intervention you want to do?

## 2025-06-09 NOTE — PROGRESS NOTES
Date of Service: Jun 6, 2025    Chief Complaint: Post operative follow up.     Date of Surgery: 5/27/2025    Procedure Performed:   1. Right middle finger mass excision, subcutaneous digit mass.     Surgeon: Dr. Margot Dooley    Interval events: Octaviano Reed is a 75 year old male who presents today for a postoperative follow up.  He is doing well.  Pain well-controlled.  Denies any numbness or tingling.    Patient does not recall injury prior to developing the mass.  Specifically he does not recall any splinters or punctures from plants.  He also denies any previous infectious symptoms.    The past medical history was reviewed updated in the EMR. This includes medications, surgeries, social history, and review of systems.    Physical examination:  Well-developed, well-nourished and in no acute distress.  Alert and oriented to surroundings.  On examination of the  right middle finger, incision is well-healed with sutures in place. There is no erythema, drainage, or dehiscence. Swelling is Mild.  Station intact to radial and ulnar digital nerves.  Patient can make a full composite fist.  Fingertip is warm and well-perfused.      Assessment: 75 year old male s/p right middle finger mass excision, progressing appropriately.  Pudgy results demonstrating fibrotic nodule containing foreign body with associated fungal organisms, granulomatous inflammation and abscess.  No evidence of infection today.    Plan:  Pathology results reviewed.  We discussed that it is possible he could have had a puncture wound such as a splinter that resulted in retained foreign body.  He does not currently have any signs of infection.  We did discuss signs and symptoms of infection, nonhealing wound that would prompt a return to the clinic.  Patient should continue to wash wound with soap and water daily and pat dry. No immersing the wound in water for prolonged periods such as tub baths or dishwashing without gloves until wound has  healed. Can start gentle active ROM as tolerated after the wound is fully healed.  Will have the patient follow-up in 6 weeks for recheck to ensure adequate healing, follow-up sooner with any questions or concerns.    BRENDA HAMILTON PA-C  Orthopaedic Surgery

## 2025-07-08 ENCOUNTER — TELEPHONE (OUTPATIENT)
Dept: ORTHOPEDICS | Facility: CLINIC | Age: 75
End: 2025-07-08
Payer: COMMERCIAL

## 2025-07-08 NOTE — TELEPHONE ENCOUNTER
ATC called pt and canceled apt. He will call with he needs anything from us.      -Edmundo, ATC- [facility]

## 2025-07-08 NOTE — TELEPHONE ENCOUNTER
Other: Patient called and stated he would like to cancel his post op appointment. Patient states that he is feeling better and does not need it. Please call patient if you habe any questions.     Could we send this information to you in Practice Management e-Tools or would you prefer to receive a phone call?:   Patient would prefer a phone call   Okay to leave a detailed message?: Yes at Cell number on file:    Telephone Information:   Mobile [phone]

## (undated) DEVICE — SOL NACL 0.9% IRRIG 500ML BOTTLE 2F7123

## (undated) DEVICE — DRSG XEROFORM 1X8"

## (undated) DEVICE — PACK MINOR HAND CUSTOM ASC 37-0097A

## (undated) DEVICE — SU MONOCRYL 4-0 PS-2 27" UND Y426H

## (undated) DEVICE — PREP CHLORAPREP 26ML TINTED HI-LITE ORANGE 930815

## (undated) DEVICE — SU VICRYL RAPIDE 4-0 PS-2 27" VR426

## (undated) DEVICE — CAST PADDING 3" STERILE 9043S

## (undated) DEVICE — LINEN ORTHO PACK 5446

## (undated) DEVICE — TOURNIQUET TOURNI-COT FINGER GREEN LG  TCL

## (undated) DEVICE — DRSG STERI STRIP 1/2X4" R1547

## (undated) DEVICE — DRSG ADAPTIC 3X8" 6113

## (undated) DEVICE — GLOVE BIOGEL PI MICRO SZ 6.0 48560

## (undated) RX ORDER — BUPIVACAINE HYDROCHLORIDE 5 MG/ML
INJECTION, SOLUTION EPIDURAL; INTRACAUDAL; PERINEURAL
Status: DISPENSED
Start: 2025-05-27

## (undated) RX ORDER — LIDOCAINE HYDROCHLORIDE 10 MG/ML
INJECTION, SOLUTION EPIDURAL; INFILTRATION; INTRACAUDAL; PERINEURAL
Status: DISPENSED
Start: 2025-05-27